# Patient Record
Sex: MALE | Race: WHITE | URBAN - METROPOLITAN AREA
[De-identification: names, ages, dates, MRNs, and addresses within clinical notes are randomized per-mention and may not be internally consistent; named-entity substitution may affect disease eponyms.]

---

## 2018-06-05 ENCOUNTER — OFFICE VISIT (OUTPATIENT)
Dept: FAMILY MEDICINE CLINIC | Age: 23
End: 2018-06-05

## 2018-06-05 VITALS
TEMPERATURE: 98.1 F | DIASTOLIC BLOOD PRESSURE: 86 MMHG | SYSTOLIC BLOOD PRESSURE: 126 MMHG | BODY MASS INDEX: 26.66 KG/M2 | OXYGEN SATURATION: 98 % | HEIGHT: 69 IN | RESPIRATION RATE: 14 BRPM | WEIGHT: 180 LBS | HEART RATE: 71 BPM

## 2018-06-05 DIAGNOSIS — L23.7 POISON IVY DERMATITIS: Primary | ICD-10-CM

## 2018-06-05 RX ORDER — PREDNISONE 20 MG/1
20 TABLET ORAL
Qty: 7 TAB | Refills: 0 | Status: SHIPPED | OUTPATIENT
Start: 2018-06-05 | End: 2018-06-12

## 2018-06-05 RX ORDER — FLUTICASONE PROPIONATE 50 MCG
2 SPRAY, SUSPENSION (ML) NASAL DAILY
COMMUNITY

## 2018-06-05 RX ORDER — TRIAMCINOLONE ACETONIDE 1 MG/G
CREAM TOPICAL 2 TIMES DAILY
Qty: 15 G | Refills: 0 | Status: SHIPPED | OUTPATIENT
Start: 2018-06-05

## 2018-06-05 NOTE — MR AVS SNAPSHOT
[address] [facility] 
 
 
 [address] [place] [address] 1364 [facility] 
[phone] Patient: Chelsie Bonilla MRN: [id number] :1995 Visit Information Date & Time Provider Department Dept. Phone Encounter #  
 2018 10:00 AM Alva Hightower MD [address] [facility] [phone] [id number] Upcoming Health Maintenance Date Due DTaP/Tdap/Td series (1 - Tdap) 2015 Influenza Age 5 to Adult 2018 Allergies as of 2018  Review Complete On: 2018 By: Alva Hightower MD  
  
 Severity Noted Reaction Type Reactions Sulfa (Sulfonamide Antibiotics)  2018   Systemic Rash Current Immunizations  Never Reviewed No immunizations on file. Not reviewed this visit You Were Diagnosed With   
  
 Codes Comments Poison ivy dermatitis    -  Primary ICD-10-CM: L23.7 ICD-9-CM: 692.6 Vitals BP Pulse Temp Resp Height(growth percentile) Weight(growth percentile) 126/86 (BP 1 Location: Left arm, BP Patient Position: Sitting) 71 98.1 °F (36.7 °C) (Oral) 14 5' 8.9\" (1.75 m) 180 lb (81.6 kg) SpO2 BMI Smoking Status 98% 26.66 kg/m2 Never Smoker Vitals History BMI and BSA Data Body Mass Index Body Surface Area  
 26.66 kg/m 2 1.99 m 2 Preferred Pharmacy Pharmacy Name Phone [facility] [address] [place] [address] Dr [address], [address] [phone] Your Updated Medication List  
  
   
This list is accurate as of 18 10:36 AM.  Always use your most recent med list.  
  
  
  
  
 FLONASE ALLERGY RELIEF 50 mcg/actuation nasal spray Generic drug:  fluticasone 2 Sprays by Both Nostrils route daily. predniSONE 20 mg tablet Commonly known as:  Darene Reels Take 1 Tab by mouth daily (with breakfast) for 7 days. triamcinolone acetonide 0.1 % topical cream  
Commonly known as:  KENALOG Apply  to affected area two (2) times a day. use thin layer ZyrTEC 10 mg Cap Generic drug:  Cetirizine Take  by mouth. Prescriptions Sent to Pharmacy Refills  
 triamcinolone acetonide (KENALOG) 0.1 % topical cream 0 Sig: Apply  to affected area two (2) times a day. use thin layer Class: Normal  
 Pharmacy: [facility] DR JOSELINE AREVALO [address], [address] [place] [address] Ph #: [phone] Route: Topical  
 predniSONE (DELTASONE) 20 mg tablet 0 Sig: Take 1 Tab by mouth daily (with breakfast) for 7 days. Class: Normal  
 Pharmacy: [facility] DR JOSELINE AREVALO [address], [address] [place] [address] Ph #: [phone] Route: Oral  
  
Introducing \[facility] [place] [facility]\"" & HEALTH SERVICES! [facility] introduces [employer] patient portal. Now you can access parts of your medical record, email your doctor's office, and request medication refills online. 1. In your internet browser, go to [email or web address] 2. Click on the First Time User? Click Here link in the Sign In box. You will see the New Member Sign Up page. 3. Enter your [employer] Access Code exactly as it appears below. You will not need to use this code after youve completed the sign-up process. If you do not sign up before the expiration date, you must request a new code. · [employer] Access Code: [email or web address] Expires: 9/3/2018 10:09 AM 
 
4. Enter the last four digits of your Social Security Number (xxxx) and Date of Birth (mm/dd/yyyy) as indicated and click Submit. You will be taken to the next sign-up page. 5. Create a [employer] ID. This will be your [employer] login ID and cannot be changed, so think of one that is secure and easy to remember. 6. Create a [employer] password. You can change your password at any time. 7. Enter your Password Reset Question and Answer. This can be used at a later time if you forget your password. 8. Enter your e-mail address. You will receive e-mail notification when new information is available in [address] 9. Click Sign Up. You can now view and download portions of your medical record. 10. Click the Download Summary menu link to download a portable copy of your medical information. If you have questions, please visit the Frequently Asked Questions section of the [employer] website. Remember, [employer] is NOT to be used for urgent needs. For medical emergencies, dial 911. Now available from your iPhone and Android! Please provide this summary of care documentation to your next provider. If you have any questions after today's visit, please call [phone].

## 2018-06-05 NOTE — PROGRESS NOTES
HPI  Qi Acosta is a 24 y.o. male who presents as a new patient with a rash. One week ago had to owen the dog through the woods. A few days later developed this rash on his lower legs, distal arms, left torso. He has allergic tendencies and takes Zyrtec and Flonase. Is status of poison ivy before so family knew what to do for this. Has tried calamine lotion, hydrocortisone cream, Zyrtec, Benadryl at night. Getting some relief from these interventions. Has a particularly impressive patch on his posterior calf that is pruritic but nontender to palpation    PMHx:  History reviewed. No pertinent past medical history. Meds:   Current Outpatient Prescriptions   Medication Sig Dispense Refill    Cetirizine (ZYRTEC) 10 mg cap Take  by mouth.  fluticasone (FLONASE ALLERGY RELIEF) 50 mcg/actuation nasal spray 2 Sprays by Both Nostrils route daily.  triamcinolone acetonide (KENALOG) 0.1 % topical cream Apply  to affected area two (2) times a day. use thin layer 15 g 0    predniSONE (DELTASONE) 20 mg tablet Take 1 Tab by mouth daily (with breakfast) for 7 days. 7 Tab 0       Allergies: Allergies   Allergen Reactions    Sulfa (Sulfonamide Antibiotics) Rash       Smoker:  History   Smoking Status    Never Smoker   Smokeless Tobacco    Never Used       ETOH:   History   Alcohol Use No       FH: History reviewed. No pertinent family history. ROS:   As listed in HPI. In addition:  Constitutional:   No headache, fever, fatigue, weight loss or weight gain      Cardiac:    No chest pain      Resp:   No cough or shortness of breath      Neuro   No loss of consciousness, dizziness, seizures      Physical Exam:  Blood pressure 126/86, pulse 71, temperature 98.1 °F (36.7 °C), temperature source Oral, resp. rate 14, height 5' 8.9\" (1.75 m), weight 180 lb (81.6 kg), SpO2 98 %. GEN: No apparent distress. Alert and oriented and responds to all questions appropriately.    NEUROLOGIC:  No focal neurologic deficits. Strength and sensation grossly intact. Coordination and gait grossly intact. EXT: Well perfused. No edema. SKIN:  poison ivy dermatitis of the distal forearms, distal legs consistent with a contact pattern. Has a few spots on his left torso that are more recent, possibly because he has re worn the clothing       Assessment and Plan     poison ivy dermatitis  Discussed Treatment options with patient including topicals and any histamines, steroid pills, steroid injections. For this particular rash recommend against injection but  could go either way on the pill depending on how he feels his symptoms are going. Mother and patient decided to try prednisone. Also needs topical therapy. Kenalog cream, Benadryl gel, sensitive skin lotion. There is a patch on the posterior calf that is swollen and firm. Probably a particularly bad patch of poison ivy. Not unreasonable to be concerned about erysipelas. Asked family to keep an eye on this. If it gets worse on the steroid will call in a antibiotic. History of cranial synostosis. Has an intellectual disability that we did not discuss. Mother alluded obliquely to Jacques Cooley is like a kid\"      ICD-10-CM ICD-9-CM    1. Poison ivy dermatitis L23.7 692.6 triamcinolone acetonide (KENALOG) 0.1 % topical cream      predniSONE (DELTASONE) 20 mg tablet       AVS given.  Pt expressed understanding of instructions